# Patient Record
Sex: FEMALE | Race: WHITE | Employment: OTHER | URBAN - METROPOLITAN AREA
[De-identification: names, ages, dates, MRNs, and addresses within clinical notes are randomized per-mention and may not be internally consistent; named-entity substitution may affect disease eponyms.]

---

## 2017-07-14 ENCOUNTER — HOSPITAL ENCOUNTER (EMERGENCY)
Age: 27
Discharge: HOME | End: 2017-07-15
Attending: EMERGENCY MEDICINE

## 2017-07-14 DIAGNOSIS — K04.7 DENTAL ABSCESS: Primary | ICD-10-CM

## 2017-07-14 DIAGNOSIS — D69.6 THROMBOCYTOPENIA (HCC): ICD-10-CM

## 2017-07-14 PROCEDURE — 96376 TX/PRO/DX INJ SAME DRUG ADON: CPT

## 2017-07-14 PROCEDURE — 99284 EMERGENCY DEPT VISIT MOD MDM: CPT

## 2017-07-14 PROCEDURE — 96365 THER/PROPH/DIAG IV INF INIT: CPT

## 2017-07-14 PROCEDURE — 96366 THER/PROPH/DIAG IV INF ADDON: CPT

## 2017-07-14 PROCEDURE — 96361 HYDRATE IV INFUSION ADD-ON: CPT

## 2017-07-14 PROCEDURE — 96375 TX/PRO/DX INJ NEW DRUG ADDON: CPT

## 2017-07-15 VITALS
OXYGEN SATURATION: 100 % | WEIGHT: 120 LBS | BODY MASS INDEX: 23 KG/M2 | HEART RATE: 62 BPM | TEMPERATURE: 98 F | DIASTOLIC BLOOD PRESSURE: 63 MMHG | RESPIRATION RATE: 18 BRPM | SYSTOLIC BLOOD PRESSURE: 103 MMHG

## 2017-07-15 LAB
BASOPHILS # BLD AUTO: 0.04 X10(3) UL (ref 0–0.1)
BASOPHILS NFR BLD AUTO: 0.4 %
EOSINOPHIL # BLD AUTO: 0.19 X10(3) UL (ref 0–0.3)
EOSINOPHIL NFR BLD AUTO: 1.7 %
ERYTHROCYTE [DISTWIDTH] IN BLOOD BY AUTOMATED COUNT: 13.6 % (ref 11.5–16)
HCT VFR BLD AUTO: 40.4 % (ref 34–50)
HGB BLD-MCNC: 14 G/DL (ref 12–16)
IMMATURE GRANULOCYTE COUNT: 0.03 X10(3) UL (ref 0–1)
IMMATURE GRANULOCYTE RATIO %: 0.3 %
LYMPHOCYTES # BLD AUTO: 2.68 X10(3) UL (ref 0.9–4)
LYMPHOCYTES NFR BLD AUTO: 24.3 %
MCH RBC QN AUTO: 32 PG (ref 27–33.2)
MCHC RBC AUTO-ENTMCNC: 34.7 G/DL (ref 31–37)
MCV RBC AUTO: 92.2 FL (ref 81–100)
MONOCYTES # BLD AUTO: 1.03 X10(3) UL (ref 0.1–0.6)
MONOCYTES NFR BLD AUTO: 9.3 %
NEUTROPHIL ABS PRELIM: 7.08 X10 (3) UL (ref 1.3–6.7)
NEUTROPHILS # BLD AUTO: 7.08 X10(3) UL (ref 1.3–6.7)
NEUTROPHILS NFR BLD AUTO: 64 %
PLATELET # BLD AUTO: 42 10(3)UL (ref 150–450)
RBC # BLD AUTO: 4.38 X10(6)UL (ref 3.8–5.1)
RED CELL DISTRIBUTION WIDTH-SD: 46.3 FL (ref 35.1–46.3)
WBC # BLD AUTO: 11.1 X10(3) UL (ref 4–13)

## 2017-07-15 PROCEDURE — S0077 INJECTION, CLINDAMYCIN PHOSP: HCPCS | Performed by: EMERGENCY MEDICINE

## 2017-07-15 PROCEDURE — 85025 COMPLETE CBC W/AUTO DIFF WBC: CPT | Performed by: EMERGENCY MEDICINE

## 2017-07-15 RX ORDER — CLINDAMYCIN HYDROCHLORIDE 300 MG/1
300 CAPSULE ORAL 3 TIMES DAILY
Qty: 30 CAPSULE | Refills: 0 | Status: SHIPPED | OUTPATIENT
Start: 2017-07-15 | End: 2017-07-25

## 2017-07-15 RX ORDER — CLINDAMYCIN PHOSPHATE 600 MG/50ML
600 INJECTION INTRAVENOUS ONCE
Status: COMPLETED | OUTPATIENT
Start: 2017-07-15 | End: 2017-07-15

## 2017-07-15 RX ORDER — HYDROMORPHONE HYDROCHLORIDE 1 MG/ML
0.5 INJECTION, SOLUTION INTRAMUSCULAR; INTRAVENOUS; SUBCUTANEOUS EVERY 30 MIN PRN
Status: DISCONTINUED | OUTPATIENT
Start: 2017-07-15 | End: 2017-07-15

## 2017-07-15 NOTE — ED PROVIDER NOTES
Patient Seen in: [facility] In [place]    History   Patient presents with:  Dental Problem (dental)    Stated Complaint: two days of dental pain and infection with swelling    HPI    This is a 57-year-old female with past medical hist days of dental pain and infection with swelling  Other systems are as noted in HPI. Constitutional and vital signs reviewed. All other systems reviewed and negative except as noted above.     PSFH elements reviewed from today and agreed except as othe -----------         ------                     CBC W/ DIFFERENTIAL[[id number]]          Abnormal            Final result                 Please view results for these tests on the individual orders.       ==============================================

## 2017-07-16 ENCOUNTER — HOSPITAL ENCOUNTER (EMERGENCY)
Facility: HOSPITAL | Age: 27
Discharge: HOME | End: 2017-07-16
Attending: EMERGENCY MEDICINE

## 2017-07-16 VITALS
SYSTOLIC BLOOD PRESSURE: 130 MMHG | HEART RATE: 92 BPM | TEMPERATURE: 97 F | RESPIRATION RATE: 18 BRPM | DIASTOLIC BLOOD PRESSURE: 84 MMHG

## 2017-07-16 DIAGNOSIS — K08.89 PAIN, DENTAL: Primary | ICD-10-CM

## 2017-07-16 PROCEDURE — 99283 EMERGENCY DEPT VISIT LOW MDM: CPT

## 2017-07-16 RX ORDER — BUPIVACAINE HYDROCHLORIDE 2.5 MG/ML
INJECTION, SOLUTION EPIDURAL; INFILTRATION; INTRACAUDAL
Status: COMPLETED
Start: 2017-07-16 | End: 2017-07-16

## 2017-07-16 RX ORDER — OXYCODONE HYDROCHLORIDE AND ACETAMINOPHEN 5; 325 MG/1; MG/1
1-2 TABLET ORAL EVERY 4 HOURS PRN
Qty: 20 TABLET | Refills: 0 | Status: SHIPPED | OUTPATIENT
Start: 2017-07-16 | End: 2017-07-23

## 2017-07-16 RX ORDER — CLINDAMYCIN HYDROCHLORIDE 150 MG/1
300 CAPSULE ORAL ONCE
Status: COMPLETED | OUTPATIENT
Start: 2017-07-16 | End: 2017-07-16

## 2017-07-16 NOTE — ED PROVIDER NOTES
Patient Seen in: [facility] Emergency Department    History   Patient presents with:  Dental Problem (dental)    Stated Complaint:     HPI    She is a 22-year-old female comes in emergency room for evaluation of dental pain.   Patient states she crac 97.4 °F (36.3 °C) (Temporal)   Resp 18   LMP 07/03/2017         Physical Exam    Constitutional: Patient is holding the right side of her jaw. Mild amount discomfort due to pain  HEENT: Poor dentition. Teeth #30 and 28 are severely cracked and carried. [address]  [facility] [address]  [phone]      As needed      Medications Prescribed:  Current Discharge Medication List

## 2017-07-17 NOTE — ED NOTES
PT CALLED ON 7/17/17 AT 1600 STATING SHE LOST HER PRESCRIPTION FOR HER ANTIBIOTICS. RX FOR CLINDAMYCIN THAT WAS WRITTEN FOR HER 2 DAYS AGO WAS CALLED INTO [facility] [phone] PER PT REQUEST.

## 2017-09-12 ENCOUNTER — HOSPITAL ENCOUNTER (EMERGENCY)
Age: 27
Discharge: HOME | End: 2017-09-12

## 2017-09-12 VITALS
SYSTOLIC BLOOD PRESSURE: 94 MMHG | HEART RATE: 78 BPM | BODY MASS INDEX: 22.08 KG/M2 | OXYGEN SATURATION: 99 % | WEIGHT: 120 LBS | TEMPERATURE: 98 F | RESPIRATION RATE: 16 BRPM | DIASTOLIC BLOOD PRESSURE: 54 MMHG | HEIGHT: 62 IN

## 2017-09-12 DIAGNOSIS — D69.6 THROMBOCYTOPENIA (HCC): ICD-10-CM

## 2017-09-12 DIAGNOSIS — K02.9 DENTAL CARIES: Primary | ICD-10-CM

## 2017-09-12 LAB
BASOPHILS # BLD AUTO: 0.03 X10(3) UL (ref 0–0.1)
BASOPHILS NFR BLD AUTO: 0.4 %
EOSINOPHIL # BLD AUTO: 0.16 X10(3) UL (ref 0–0.3)
EOSINOPHIL NFR BLD AUTO: 1.9 %
ERYTHROCYTE [DISTWIDTH] IN BLOOD BY AUTOMATED COUNT: 12.6 % (ref 11.5–16)
HCT VFR BLD AUTO: 37 % (ref 34–50)
HGB BLD-MCNC: 12.6 G/DL (ref 12–16)
IMMATURE GRANULOCYTE COUNT: 0.01 X10(3) UL (ref 0–1)
IMMATURE GRANULOCYTE RATIO %: 0.1 %
LYMPHOCYTES # BLD AUTO: 2.74 X10(3) UL (ref 0.9–4)
LYMPHOCYTES NFR BLD AUTO: 32.6 %
MCH RBC QN AUTO: 32.1 PG (ref 27–33.2)
MCHC RBC AUTO-ENTMCNC: 34.1 G/DL (ref 31–37)
MCV RBC AUTO: 94.4 FL (ref 81–100)
MONOCYTES # BLD AUTO: 0.75 X10(3) UL (ref 0.1–0.6)
MONOCYTES NFR BLD AUTO: 8.9 %
NEUTROPHIL ABS PRELIM: 4.71 X10 (3) UL (ref 1.3–6.7)
NEUTROPHILS # BLD AUTO: 4.71 X10(3) UL (ref 1.3–6.7)
NEUTROPHILS NFR BLD AUTO: 56.1 %
PLATELET # BLD AUTO: 36 10(3)UL (ref 150–450)
RBC # BLD AUTO: 3.92 X10(6)UL (ref 3.8–5.1)
RED CELL DISTRIBUTION WIDTH-SD: 43.3 FL (ref 35.1–46.3)
WBC # BLD AUTO: 8.4 X10(3) UL (ref 4–13)

## 2017-09-12 PROCEDURE — 96372 THER/PROPH/DIAG INJ SC/IM: CPT

## 2017-09-12 PROCEDURE — 99283 EMERGENCY DEPT VISIT LOW MDM: CPT

## 2017-09-12 PROCEDURE — 85025 COMPLETE CBC W/AUTO DIFF WBC: CPT | Performed by: NURSE PRACTITIONER

## 2017-09-12 PROCEDURE — 36415 COLL VENOUS BLD VENIPUNCTURE: CPT

## 2017-09-12 RX ORDER — MORPHINE SULFATE 2 MG/ML
2 INJECTION, SOLUTION INTRAMUSCULAR; INTRAVENOUS ONCE
Status: COMPLETED | OUTPATIENT
Start: 2017-09-12 | End: 2017-09-12

## 2017-09-12 RX ORDER — CHLORHEXIDINE GLUCONATE 0.12 MG/ML
15 RINSE ORAL 2 TIMES DAILY
Qty: 1 BOTTLE | Refills: 0 | Status: SHIPPED | OUTPATIENT
Start: 2017-09-12

## 2017-09-12 RX ORDER — CLINDAMYCIN HYDROCHLORIDE 300 MG/1
300 CAPSULE ORAL 3 TIMES DAILY
Qty: 30 CAPSULE | Refills: 0 | Status: SHIPPED | OUTPATIENT
Start: 2017-09-12 | End: 2017-09-22

## 2017-09-12 NOTE — ED PROVIDER NOTES
Patient Seen in: [facility] Emergency Department In [facility]    History   Patient presents with:  Dental Problem (dental)    Stated Complaint: dental pain    42-year-old female who presents to the emergency room with complaints of chronic dental pain and o Negative for dizziness and headaches. All other systems reviewed and are negative. Positive for stated complaint: dental pain  Other systems are as noted in HPI. Constitutional and vital signs reviewed.       All other systems reviewed and negative place, and time. Skin: Skin is warm and dry. She is not diaphoretic. Psychiatric: She has a normal mood and affect. Her behavior is normal. Judgment and thought content normal.   Nursing note and vitals reviewed.       ED Course   BP medications at this evaluation and care. Patient states they understand diagnosis, followup plan and agree with and understand  discharge instructions and plan. I answered all of the patient's questions prior to discharge.           Disposition and Plan     Clinical Impression

## 2017-09-12 NOTE — ED INITIAL ASSESSMENT (HPI)
Right lower dental pain since yesterday, known abscess, on abx few months ago, needs to see oral surgeon

## 2017-12-07 ENCOUNTER — HOSPITAL ENCOUNTER (EMERGENCY)
Facility: HOSPITAL | Age: 27
Discharge: HOME | End: 2017-12-07
Attending: EMERGENCY MEDICINE

## 2017-12-07 VITALS
HEIGHT: 61 IN | WEIGHT: 128 LBS | HEART RATE: 68 BPM | RESPIRATION RATE: 16 BRPM | DIASTOLIC BLOOD PRESSURE: 71 MMHG | SYSTOLIC BLOOD PRESSURE: 114 MMHG | BODY MASS INDEX: 24.17 KG/M2 | OXYGEN SATURATION: 98 % | TEMPERATURE: 98 F

## 2017-12-07 DIAGNOSIS — K02.9 PAIN DUE TO DENTAL CARIES: Primary | ICD-10-CM

## 2017-12-07 PROCEDURE — 64400 NJX AA&/STRD TRIGEMINAL NRV: CPT

## 2017-12-07 PROCEDURE — 99283 EMERGENCY DEPT VISIT LOW MDM: CPT

## 2017-12-07 PROCEDURE — 96372 THER/PROPH/DIAG INJ SC/IM: CPT

## 2017-12-07 RX ORDER — CLINDAMYCIN HYDROCHLORIDE 150 MG/1
150 CAPSULE ORAL 3 TIMES DAILY
Qty: 30 CAPSULE | Refills: 0 | Status: ON HOLD | OUTPATIENT
Start: 2017-12-07 | End: 2017-12-10

## 2017-12-07 RX ORDER — HYDROMORPHONE HYDROCHLORIDE 1 MG/ML
1 INJECTION, SOLUTION INTRAMUSCULAR; INTRAVENOUS; SUBCUTANEOUS ONCE
Status: COMPLETED | OUTPATIENT
Start: 2017-12-07 | End: 2017-12-07

## 2017-12-07 RX ORDER — HYDROCODONE BITARTRATE AND ACETAMINOPHEN 5; 325 MG/1; MG/1
1-2 TABLET ORAL EVERY 4 HOURS PRN
Qty: 10 TABLET | Refills: 0 | Status: SHIPPED | OUTPATIENT
Start: 2017-12-07 | End: 2017-12-10

## 2017-12-07 NOTE — ED PROVIDER NOTES
Patient Seen in: [facility] Emergency Department    History   Patient presents with:  Dental Problem (dental)    Stated Complaint: dental pain    HPI    This is a 49-year-old female complaining of dental pain the patient has a history of severe dent to display    ED Course as of Dec 07 1203  ------------------------------------------------------------       MDM   Patient requested a local injection of anesthetic as she had had this before she was given Sensorcaine local block near the tooth #18 with i

## 2017-12-08 ENCOUNTER — HOSPITAL ENCOUNTER (EMERGENCY)
Facility: HOSPITAL | Age: 27
Discharge: HOME | End: 2017-12-08
Attending: STUDENT IN AN ORGANIZED HEALTH CARE EDUCATION/TRAINING PROGRAM

## 2017-12-08 ENCOUNTER — APPOINTMENT (OUTPATIENT)
Dept: CT IMAGING | Facility: HOSPITAL | Age: 27
End: 2017-12-08
Attending: STUDENT IN AN ORGANIZED HEALTH CARE EDUCATION/TRAINING PROGRAM

## 2017-12-08 VITALS
DIASTOLIC BLOOD PRESSURE: 77 MMHG | WEIGHT: 128 LBS | OXYGEN SATURATION: 99 % | TEMPERATURE: 98 F | RESPIRATION RATE: 14 BRPM | HEART RATE: 59 BPM | SYSTOLIC BLOOD PRESSURE: 125 MMHG | HEIGHT: 61 IN | BODY MASS INDEX: 24.17 KG/M2

## 2017-12-08 DIAGNOSIS — K12.2 CELLULITIS OF SUBMANDIBULAR REGION: ICD-10-CM

## 2017-12-08 DIAGNOSIS — K08.89 PAIN, DENTAL: Primary | ICD-10-CM

## 2017-12-08 PROCEDURE — 76377 3D RENDER W/INTRP POSTPROCES: CPT | Performed by: STUDENT IN AN ORGANIZED HEALTH CARE EDUCATION/TRAINING PROGRAM

## 2017-12-08 PROCEDURE — 96375 TX/PRO/DX INJ NEW DRUG ADDON: CPT

## 2017-12-08 PROCEDURE — 80053 COMPREHEN METABOLIC PANEL: CPT | Performed by: STUDENT IN AN ORGANIZED HEALTH CARE EDUCATION/TRAINING PROGRAM

## 2017-12-08 PROCEDURE — 70486 CT MAXILLOFACIAL W/O DYE: CPT | Performed by: STUDENT IN AN ORGANIZED HEALTH CARE EDUCATION/TRAINING PROGRAM

## 2017-12-08 PROCEDURE — 99284 EMERGENCY DEPT VISIT MOD MDM: CPT

## 2017-12-08 PROCEDURE — 85025 COMPLETE CBC W/AUTO DIFF WBC: CPT | Performed by: STUDENT IN AN ORGANIZED HEALTH CARE EDUCATION/TRAINING PROGRAM

## 2017-12-08 PROCEDURE — S0077 INJECTION, CLINDAMYCIN PHOSP: HCPCS | Performed by: STUDENT IN AN ORGANIZED HEALTH CARE EDUCATION/TRAINING PROGRAM

## 2017-12-08 PROCEDURE — 96365 THER/PROPH/DIAG IV INF INIT: CPT

## 2017-12-08 PROCEDURE — 96361 HYDRATE IV INFUSION ADD-ON: CPT

## 2017-12-08 RX ORDER — HYDROMORPHONE HYDROCHLORIDE 1 MG/ML
0.5 INJECTION, SOLUTION INTRAMUSCULAR; INTRAVENOUS; SUBCUTANEOUS ONCE
Status: COMPLETED | OUTPATIENT
Start: 2017-12-08 | End: 2017-12-08

## 2017-12-08 RX ORDER — CLINDAMYCIN PHOSPHATE 600 MG/50ML
600 INJECTION INTRAVENOUS EVERY 8 HOURS
Status: DISCONTINUED | OUTPATIENT
Start: 2017-12-08 | End: 2017-12-08

## 2017-12-08 RX ORDER — ONDANSETRON 2 MG/ML
4 INJECTION INTRAMUSCULAR; INTRAVENOUS ONCE
Status: COMPLETED | OUTPATIENT
Start: 2017-12-08 | End: 2017-12-08

## 2017-12-08 RX ORDER — HYDROCODONE BITARTRATE AND ACETAMINOPHEN 5; 325 MG/1; MG/1
1-2 TABLET ORAL EVERY 4 HOURS PRN
Qty: 5 TABLET | Refills: 0 | Status: ON HOLD | OUTPATIENT
Start: 2017-12-08 | End: 2017-12-10

## 2017-12-08 NOTE — ED NOTES
Pt asked for a 3rd dose of dilaudid several times because she still had pain. Pt was told that Dr. Glendy Grady is aware and is not ordering any more pain medication. Pt is AxOx4, ambulatory with steady gait, and going home with her friend.

## 2017-12-08 NOTE — ED PROVIDER NOTES
Patient Seen in: [facility] Emergency Department    History   Patient presents with:  Dental Problem (dental)    Stated Complaint: dental pain     HPI    Patient is a 57-year-old female who presents the emergency department reporting severe left low vital signs reviewed. All other systems reviewed and negative except as noted above.     Physical Exam   ED Triage Vitals [12/08/17 0418]  BP: 125/77  Pulse: 60  Resp: 18  Temp: 97.6 °F (36.4 °C)  Temp src: Temporal  SpO2: 99 %  O2 Device: None (Room a components within normal limits   CBC WITH DIFFERENTIAL WITH PLATELET    Narrative: The following orders were created for panel order CBC WITH DIFFERENTIAL WITH PLATELET.   Procedure                               Abnormality         Status [place] [id number]  [phone]    Today          Medications Prescribed:  Current Discharge Medication List    START taking these medications    !! HYDROcodone-acetaminophen 5-325 MG Oral Tab  Take 1-2 tablets by mouth every 4 (four) hours as nepatricia

## 2017-12-08 NOTE — ED INITIAL ASSESSMENT (HPI)
Pt states she is here because the ER doctor yesterday prescribed her the \"wrong antibiotic. \" When asked why it was Sherryle Curb" she stated it is because she is allergic to cillins and because she is still in pain.  Pt was educated on the fact that the clinda

## 2017-12-09 ENCOUNTER — APPOINTMENT (OUTPATIENT)
Dept: CT IMAGING | Facility: HOSPITAL | Age: 27
DRG: 158 | End: 2017-12-09
Attending: EMERGENCY MEDICINE

## 2017-12-09 ENCOUNTER — HOSPITAL ENCOUNTER (INPATIENT)
Facility: HOSPITAL | Age: 27
LOS: 1 days | Discharge: HOME | DRG: 158 | End: 2017-12-10
Attending: EMERGENCY MEDICINE | Admitting: HOSPITALIST

## 2017-12-09 DIAGNOSIS — K12.2 SUBMANDIBULAR ABSCESS: Primary | ICD-10-CM

## 2017-12-09 PROBLEM — D72.829 LEUKOCYTOSIS: Status: ACTIVE | Noted: 2017-12-09

## 2017-12-09 PROBLEM — D69.6 THROMBOCYTOPENIA (HCC): Status: ACTIVE | Noted: 2017-12-09

## 2017-12-09 PROBLEM — R73.9 HYPERGLYCEMIA: Status: ACTIVE | Noted: 2017-12-09

## 2017-12-09 PROCEDURE — 70491 CT SOFT TISSUE NECK W/DYE: CPT | Performed by: EMERGENCY MEDICINE

## 2017-12-09 PROCEDURE — 99223 1ST HOSP IP/OBS HIGH 75: CPT | Performed by: HOSPITALIST

## 2017-12-09 RX ORDER — SODIUM CHLORIDE 9 MG/ML
INJECTION, SOLUTION INTRAVENOUS ONCE
Status: COMPLETED | OUTPATIENT
Start: 2017-12-09 | End: 2017-12-09

## 2017-12-09 RX ORDER — ONDANSETRON 2 MG/ML
4 INJECTION INTRAMUSCULAR; INTRAVENOUS EVERY 4 HOURS PRN
Status: DISCONTINUED | OUTPATIENT
Start: 2017-12-09 | End: 2017-12-10

## 2017-12-09 RX ORDER — CLINDAMYCIN PHOSPHATE 600 MG/50ML
600 INJECTION INTRAVENOUS ONCE
Status: COMPLETED | OUTPATIENT
Start: 2017-12-09 | End: 2017-12-09

## 2017-12-09 RX ORDER — KETOROLAC TROMETHAMINE 30 MG/ML
15 INJECTION, SOLUTION INTRAMUSCULAR; INTRAVENOUS EVERY 6 HOURS PRN
Status: DISCONTINUED | OUTPATIENT
Start: 2017-12-09 | End: 2017-12-09

## 2017-12-09 RX ORDER — DEXAMETHASONE SODIUM PHOSPHATE 4 MG/ML
8 VIAL (ML) INJECTION EVERY 6 HOURS
Status: DISCONTINUED | OUTPATIENT
Start: 2017-12-09 | End: 2017-12-10

## 2017-12-09 RX ORDER — ACETAMINOPHEN 500 MG
1000 TABLET ORAL
COMMUNITY

## 2017-12-09 RX ORDER — ONDANSETRON 2 MG/ML
4 INJECTION INTRAMUSCULAR; INTRAVENOUS ONCE
Status: COMPLETED | OUTPATIENT
Start: 2017-12-09 | End: 2017-12-09

## 2017-12-09 RX ORDER — NICOTINE 21 MG/24HR
1 PATCH, TRANSDERMAL 24 HOURS TRANSDERMAL DAILY
Status: DISCONTINUED | OUTPATIENT
Start: 2017-12-09 | End: 2017-12-10

## 2017-12-09 RX ORDER — HYDROMORPHONE HYDROCHLORIDE 1 MG/ML
0.5 INJECTION, SOLUTION INTRAMUSCULAR; INTRAVENOUS; SUBCUTANEOUS EVERY 30 MIN PRN
Status: ACTIVE | OUTPATIENT
Start: 2017-12-09 | End: 2017-12-09

## 2017-12-09 RX ORDER — SODIUM CHLORIDE 9 MG/ML
INJECTION, SOLUTION INTRAVENOUS CONTINUOUS
Status: ACTIVE | OUTPATIENT
Start: 2017-12-09 | End: 2017-12-09

## 2017-12-09 RX ORDER — HYDROCODONE BITARTRATE AND ACETAMINOPHEN 5; 325 MG/1; MG/1
1-2 TABLET ORAL EVERY 4 HOURS PRN
Status: DISCONTINUED | OUTPATIENT
Start: 2017-12-09 | End: 2017-12-10

## 2017-12-09 RX ORDER — HYDROMORPHONE HYDROCHLORIDE 1 MG/ML
1 INJECTION, SOLUTION INTRAMUSCULAR; INTRAVENOUS; SUBCUTANEOUS EVERY 30 MIN PRN
Status: DISCONTINUED | OUTPATIENT
Start: 2017-12-09 | End: 2017-12-09

## 2017-12-09 RX ORDER — HYDROMORPHONE HYDROCHLORIDE 1 MG/ML
0.5 INJECTION, SOLUTION INTRAMUSCULAR; INTRAVENOUS; SUBCUTANEOUS EVERY 4 HOURS PRN
Status: DISCONTINUED | OUTPATIENT
Start: 2017-12-09 | End: 2017-12-10

## 2017-12-09 RX ORDER — CLINDAMYCIN PHOSPHATE 600 MG/50ML
600 INJECTION INTRAVENOUS EVERY 6 HOURS
Status: DISCONTINUED | OUTPATIENT
Start: 2017-12-09 | End: 2017-12-10

## 2017-12-09 RX ORDER — POTASSIUM CHLORIDE 14.9 MG/ML
20 INJECTION INTRAVENOUS ONCE
Status: COMPLETED | OUTPATIENT
Start: 2017-12-09 | End: 2017-12-09

## 2017-12-09 RX ORDER — CLINDAMYCIN PHOSPHATE 600 MG/50ML
600 INJECTION INTRAVENOUS EVERY 8 HOURS
Status: DISCONTINUED | OUTPATIENT
Start: 2017-12-09 | End: 2017-12-09

## 2017-12-09 RX ORDER — ALPRAZOLAM 1 MG/1
1 TABLET ORAL 3 TIMES DAILY
Status: DISCONTINUED | OUTPATIENT
Start: 2017-12-09 | End: 2017-12-10

## 2017-12-09 RX ORDER — ONDANSETRON 2 MG/ML
4 INJECTION INTRAMUSCULAR; INTRAVENOUS EVERY 6 HOURS PRN
Status: DISCONTINUED | OUTPATIENT
Start: 2017-12-09 | End: 2017-12-10

## 2017-12-09 RX ORDER — ACETAMINOPHEN 325 MG/1
650 TABLET ORAL EVERY 6 HOURS PRN
Status: DISCONTINUED | OUTPATIENT
Start: 2017-12-09 | End: 2017-12-10

## 2017-12-09 RX ORDER — KETOROLAC TROMETHAMINE 30 MG/ML
30 INJECTION, SOLUTION INTRAMUSCULAR; INTRAVENOUS EVERY 6 HOURS PRN
Status: DISCONTINUED | OUTPATIENT
Start: 2017-12-09 | End: 2017-12-09

## 2017-12-09 NOTE — ED PROVIDER NOTES
Patient Seen in: [facility] Emergency Department    History   Patient presents with:  Dental Problem (dental)    Stated Complaint: dental infection, worsening    HPI    Patient is a 42-year-old female presents emergency room for evaluation of left-s accommodation, extraocular motion is intact, sclerae white, conjunctiva is pink. .  Extremely poor dentition. Unable to visualize posterior pharynx.  patient has trismus and only able to open the mouth about 2 cm  NECK: Patient with moderate to severe lef of the mandible (best seen on axial im 30-31 and cor im 22), measuring ~13 x 4 x 12 mm with peripheral enhancement.    Also infiltration of the parapharyngeal fat, left greater than right, and the left submandibular gland appears mildly enlarged and hyperva Discharge Medication List        Present on Admission  Date Reviewed: 8/11/2015          ICD-10-CM Noted POA    Hyperglycemia R73.9 12/9/2017 Yes    Leukocytosis D72.829 12/9/2017 Yes    Submandibular abscess K12.2 12/9/2017 Unknown    Thrombocytopenia (HC

## 2017-12-09 NOTE — PLAN OF CARE
GASTROINTESTINAL - ADULT    • Maintains adequate nutritional intake (undernourished) Progressing        Patient/Family Goals    • Patient/Family Short Term Goal Progressing            PROBLEM: Left submandibular abscess     ASSESS: Pt a&ox4, VSS - SBP in t

## 2017-12-09 NOTE — PROGRESS NOTES
ARRIVED TO FLOOR WITH COMPLAIN OF SEVERE PAIN TO HER LEFT FACE. DILAUDID 1 MG WAS GIVEN IV AS PER HOLDING ORDER. WILL MONITOR FOR RELIEF.

## 2017-12-09 NOTE — ED INITIAL ASSESSMENT (HPI)
Pt presents to ED with dental pain and facial swelling. Pt seen in ED twice for same in the last 2 days. Reports she was unable to followup with dentist because she slept late.  Norco without relief

## 2017-12-09 NOTE — H&P
[facility]  History and Physical     Ana M Hargrove Patient Status:  Inpatient    1990 MRN [id number]   [facility] 5NW-A Attending Raffaele Carlton, DO   Hosp Day # 0 PCP None Pcp     Chief Complaint: Facial pain/swelling    Hist Mouth/Throat Solution Use as directed 15 mL in the mouth or throat 2 (two) times daily. Disp: 1 Bottle Rfl: 0   alprazolam 1 MG Oral Tab Take 1 mg by mouth 3 (three) times daily.    Disp:  Rfl:        Review of Systems:   A comprehensive 14 point review of / PLAN:     1. Dion's angina/left subperiosteal abscess  1. CT soft tissue neck noted  2. Oral surgeon to eval for abscess drainage in AM  3. NPO, IVF, anti-emetics, pain control   4. Continue IV clindamycin   2. Anxiety disorder  1.  Schedule xanax per h

## 2017-12-09 NOTE — ED NOTES
Pt c/o pain. Pt states \"the pain medication worked for like 3 minutes but now its worse. \"  Orders reviewed and will medicate as ordered.

## 2017-12-09 NOTE — PROGRESS NOTES
12/09/17 2166   Clinical Encounter Type   Visited With Patient   Routine Visit ([facility]go responded to request for spiritual care.)   Continue Visiting Yes  (Pt wants communion, but tonight she is NPO.   Pt will hve nurse request communion for her when she

## 2017-12-09 NOTE — PROGRESS NOTES
NURSING ADMISSION NOTE      Patient RECEIVED FROM THE ED via Cart AND ADMITTED TO [address] DX OF SUBMANDIBULAR ABCESS. IS APPEARS DROWSY BUT ABLE TO ANSWER QUESTIONS ON ADMISSION NAVIGATOR.  SHE RATED HER PAIN AT 12 (PN A SCALE OF 1 TO 10) UPON ARRI

## 2017-12-09 NOTE — CONSULTS
OMFS Consult Note  Asked to see patient admitted thru ED for left orofacial swelling. Patient is  31 y/o female who states selling developed yesterday. At time of visit patient is in moderate distress secondary to pain. She denies SOB.   Does c/o pain wit

## 2017-12-09 NOTE — PROGRESS NOTES
12/09/17 2376   Clinical Encounter Type   Visited With Patient   Routine Visit ([facility] responded to request for spiritual care.)   Continue Visiting Yes  (Pt wants communion, but tonight she is NPO.   Pt will have nurse request communion for her when sh

## 2017-12-09 NOTE — PROGRESS NOTES
[facility]  Progress Note     Tori Ruiz Patient Status:  Inpatient    1990 MRN [id number]   [facility] 5NW-A Attending Ledy Vora MD   Hosp Day # 0 PCP None Pcp     Chief Complaint: facial swelling, pain    S: Patien abscess  1. CT soft tissue neck noted  2. Oral surgeon following  3. Plan to monitor with IV abx for now  4. NPO, IVF, anti-emetics, pain control   5. Continue IV clindamycin   2. Anxiety disorder  1. Schedule xanax per home regimen  3.  Opiate dependence

## 2017-12-10 VITALS
OXYGEN SATURATION: 96 % | TEMPERATURE: 98 F | DIASTOLIC BLOOD PRESSURE: 67 MMHG | SYSTOLIC BLOOD PRESSURE: 108 MMHG | RESPIRATION RATE: 16 BRPM | HEART RATE: 58 BPM | BODY MASS INDEX: 22.6 KG/M2 | WEIGHT: 119.69 LBS | HEIGHT: 61 IN

## 2017-12-10 PROCEDURE — 99239 HOSP IP/OBS DSCHRG MGMT >30: CPT | Performed by: INTERNAL MEDICINE

## 2017-12-10 RX ORDER — HYDROCODONE BITARTRATE AND ACETAMINOPHEN 5; 325 MG/1; MG/1
1-2 TABLET ORAL EVERY 4 HOURS PRN
Qty: 30 TABLET | Refills: 0 | Status: SHIPPED | OUTPATIENT
Start: 2017-12-10

## 2017-12-10 RX ORDER — METHYLPREDNISOLONE 4 MG/1
TABLET ORAL
Qty: 21 TABLET | Refills: 0 | Status: SHIPPED | OUTPATIENT
Start: 2017-12-10 | End: 2018-09-07

## 2017-12-10 RX ORDER — CLINDAMYCIN HYDROCHLORIDE 150 MG/1
300 CAPSULE ORAL 4 TIMES DAILY
Qty: 30 CAPSULE | Refills: 0 | Status: SHIPPED | OUTPATIENT
Start: 2017-12-10 | End: 2017-12-20

## 2017-12-10 NOTE — DISCHARGE SUMMARY
[facility]  DISCHARGE SUMMARY     Samantha Slade Patient Status:  Inpatient    1990 MRN [id number]   [facility] 5NW-A Attending Naheed Barreto MD   [facility] Day # 1 PCP None Pcp     Date of Admission: 2017  Date of Discharge results pending at Discharge:   · none    Consultants:  • Oral Surgery    Discharge Medication List:     Discharge Medications      START taking these medications      Instructions Prescription details   methylPREDNISolone 4 MG Tbpk  Commonly known as:  ME medications  · HYDROcodone-acetaminophen 5-325 MG Tabs         Follow-up appointment:   Ochoa Melgar DDS  [address] [place] [address]  [phone]    In 1 day        Vital signs:  Temp:  [97.4 °F (36.3 °C)-97.5 °F (36.4 °C)] 97.5 °F (36.4

## 2017-12-10 NOTE — PROGRESS NOTES
Patient seen tonight. Still c/o pain needing Dilaudid on regular regimen. States feels slightly better. Exam:  Interincisal opening 20 mm. Oropharynx visualized . No pharyngeal swelling. Nadege Bucker FOM soft. Tongue not elevated.    Left lower buccal swelling re

## 2017-12-10 NOTE — PROGRESS NOTES
[facility]  Progress Note     Neeru Holbrook Patient Status:  Inpatient    1990 MRN [id number]   [facility] 5NW-A Attending Lashay Mccullough MD   [facility] Day # 1 PCP None Pcp     Chief Complaint: facial swelling, pain    S: Patien mg Intravenous Q6H   • clindamycin  600 mg Intravenous Q6H   • nicotine  1 patch Transdermal Daily       ASSESSMENT / PLAN:     1. Dion's angina/left subperiosteal abscess  1. CT soft tissue neck noted  2. Oral surgeon following  3.  Plan to monitor with

## 2017-12-10 NOTE — PROGRESS NOTES
NURSING DISCHARGE NOTE    Discharged Home via Ambulatory. Accompanied by Family member  Belongings Taken by patient/family.     Discharge instructions reviewed with patient and family member at the bedside, PIV discontinued, instructed on the importanc

## 2017-12-10 NOTE — PLAN OF CARE
GASTROINTESTINAL - ADULT    • Maintains adequate nutritional intake (undernourished) Completed        METABOLIC/FLUID AND ELECTROLYTES - ADULT    • Electrolytes maintained within normal limits Completed        NEUROLOGICAL - ADULT    • Achieves stable or i

## 2017-12-10 NOTE — PROGRESS NOTES
Patient seen this AM.  Still c/o pain ,however, it has reduced in intensity. VSS  Afebrile  Facial swelling reduced. No erythema. Inter incisal opening 20 mm. Oropharynx and FOM WNL  Left buccal swelling reduced. A: Resolving cellulitis.   P:Recommend to

## 2017-12-10 NOTE — PLAN OF CARE
Received patient a/ox4. Continues to report pain to left side of face. She is requesting that her Dilaudid dose be increased. Swelling improved, no new complications noted. She was medicated throughout the night with Dilaudid and norco prn.  She was updated

## 2017-12-13 ENCOUNTER — HOSPITAL ENCOUNTER (EMERGENCY)
Facility: HOSPITAL | Age: 27
Discharge: HOME | End: 2017-12-13
Attending: EMERGENCY MEDICINE

## 2017-12-13 VITALS
BODY MASS INDEX: 24.17 KG/M2 | TEMPERATURE: 98 F | OXYGEN SATURATION: 98 % | RESPIRATION RATE: 18 BRPM | HEART RATE: 77 BPM | WEIGHT: 128 LBS | DIASTOLIC BLOOD PRESSURE: 67 MMHG | SYSTOLIC BLOOD PRESSURE: 120 MMHG | HEIGHT: 61 IN

## 2017-12-13 DIAGNOSIS — K08.89 PAIN, DENTAL: Primary | ICD-10-CM

## 2017-12-13 PROCEDURE — 99281 EMR DPT VST MAYX REQ PHY/QHP: CPT

## 2017-12-14 NOTE — ED INITIAL ASSESSMENT (HPI)
Pt is here with dental pain. She has been coming to the ED several times for the dental pain. She was admitted and was discharged 3 days ago. She states she missed her appointment with the oral surgeon.

## 2017-12-14 NOTE — ED PROVIDER NOTES
Patient Seen in: [facility] Emergency Department    History   Patient presents with:  Dental Problem (dental)    Stated Complaint: DENTAL PAIN    HPI    20-year-old female presents to the emergency department complaining of persistent jaw pain.   Vicki Boudreaux light.  Extra ocular motions are intact. No scleral icterus or conjunctival pallor: Neck is supple without tenderness on palpation. Head is atraumatic normocephalic. Oral mucosa moist.  Tongue is midline. No posterior pharyngeal lesions.   No stridorous

## 2017-12-14 NOTE — ED NOTES
Pt is not happy about being discharged. She was instructed to see an oral surgeon. She states she won't go to [place] because it will take so long to be seen. Discharge instructions were reviewed. Pt is AxOx4 and ambulatory with steady gait.

## 2018-09-03 ENCOUNTER — HOSPITAL ENCOUNTER (OUTPATIENT)
Facility: HOSPITAL | Age: 28
Setting detail: OBSERVATION
Discharge: HOME | End: 2018-09-05
Attending: EMERGENCY MEDICINE | Admitting: HOSPITALIST

## 2018-09-03 DIAGNOSIS — O26.892 ABDOMINAL PAIN DURING PREGNANCY IN SECOND TRIMESTER: Primary | ICD-10-CM

## 2018-09-03 DIAGNOSIS — R10.9 ABDOMINAL PAIN DURING PREGNANCY IN SECOND TRIMESTER: Primary | ICD-10-CM

## 2018-09-03 PROBLEM — N23 RENAL COLIC: Status: ACTIVE | Noted: 2018-09-03

## 2018-09-03 LAB
ALBUMIN SERPL-MCNC: 2.9 G/DL (ref 3.5–4.8)
ALBUMIN/GLOB SERPL: 1 {RATIO} (ref 1–2)
ALP LIVER SERPL-CCNC: 60 U/L (ref 37–98)
ALT SERPL-CCNC: 18 U/L (ref 14–54)
ANION GAP SERPL CALC-SCNC: 6 MMOL/L (ref 0–18)
ANION GAP SERPL CALC-SCNC: 8 MMOL/L (ref 0–18)
AST SERPL-CCNC: 15 U/L (ref 15–41)
BASOPHILS # BLD AUTO: 0.05 X10(3) UL (ref 0–0.1)
BASOPHILS # BLD AUTO: 0.05 X10(3) UL (ref 0–0.1)
BASOPHILS NFR BLD AUTO: 0.4 %
BASOPHILS NFR BLD AUTO: 0.4 %
BILIRUB SERPL-MCNC: 0.1 MG/DL (ref 0.1–2)
BILIRUB UR QL STRIP.AUTO: NEGATIVE
BUN BLD-MCNC: 7 MG/DL (ref 8–20)
BUN BLD-MCNC: 8 MG/DL (ref 8–20)
BUN/CREAT SERPL: 20 (ref 10–20)
BUN/CREAT SERPL: 21.9 (ref 10–20)
CALCIUM BLD-MCNC: 8.3 MG/DL (ref 8.3–10.3)
CALCIUM BLD-MCNC: 8.7 MG/DL (ref 8.3–10.3)
CHLORIDE SERPL-SCNC: 110 MMOL/L (ref 101–111)
CHLORIDE SERPL-SCNC: 111 MMOL/L (ref 101–111)
CLARITY UR REFRACT.AUTO: CLEAR
CO2 SERPL-SCNC: 20 MMOL/L (ref 22–32)
CO2 SERPL-SCNC: 24 MMOL/L (ref 22–32)
CREAT BLD-MCNC: 0.32 MG/DL (ref 0.55–1.02)
CREAT BLD-MCNC: 0.4 MG/DL (ref 0.55–1.02)
EOSINOPHIL # BLD AUTO: 0.19 X10(3) UL (ref 0–0.3)
EOSINOPHIL # BLD AUTO: 0.23 X10(3) UL (ref 0–0.3)
EOSINOPHIL NFR BLD AUTO: 1.5 %
EOSINOPHIL NFR BLD AUTO: 1.7 %
ERYTHROCYTE [DISTWIDTH] IN BLOOD BY AUTOMATED COUNT: 12.5 % (ref 11.5–16)
ERYTHROCYTE [DISTWIDTH] IN BLOOD BY AUTOMATED COUNT: 12.6 % (ref 11.5–16)
GLOBULIN PLAS-MCNC: 3 G/DL (ref 2.5–4)
GLUCOSE BLD-MCNC: 86 MG/DL (ref 70–99)
GLUCOSE BLD-MCNC: 87 MG/DL (ref 70–99)
GLUCOSE UR STRIP.AUTO-MCNC: NEGATIVE MG/DL
HCG URINE QUALITATIVE: POSITIVE
HCT VFR BLD AUTO: 34.8 % (ref 34–50)
HCT VFR BLD AUTO: 35.2 % (ref 34–50)
HGB BLD-MCNC: 11.6 G/DL (ref 12–16)
HGB BLD-MCNC: 11.9 G/DL (ref 12–16)
IMMATURE GRANULOCYTE COUNT: 0.2 X10(3) UL (ref 0–1)
IMMATURE GRANULOCYTE COUNT: 0.23 X10(3) UL (ref 0–1)
IMMATURE GRANULOCYTE RATIO %: 1.6 %
IMMATURE GRANULOCYTE RATIO %: 1.7 %
KETONES UR STRIP.AUTO-MCNC: NEGATIVE MG/DL
LEUKOCYTE ESTERASE UR QL STRIP.AUTO: NEGATIVE
LIPASE: 114 U/L (ref 73–393)
LYMPHOCYTES # BLD AUTO: 2.84 X10(3) UL (ref 0.9–4)
LYMPHOCYTES # BLD AUTO: 2.93 X10(3) UL (ref 0.9–4)
LYMPHOCYTES NFR BLD AUTO: 21.1 %
LYMPHOCYTES NFR BLD AUTO: 23 %
M PROTEIN MFR SERPL ELPH: 5.9 G/DL (ref 6.1–8.3)
MCH RBC QN AUTO: 32.4 PG (ref 27–33.2)
MCH RBC QN AUTO: 32.4 PG (ref 27–33.2)
MCHC RBC AUTO-ENTMCNC: 33.3 G/DL (ref 31–37)
MCHC RBC AUTO-ENTMCNC: 33.8 G/DL (ref 31–37)
MCV RBC AUTO: 95.9 FL (ref 81–100)
MCV RBC AUTO: 97.2 FL (ref 81–100)
MONOCYTES # BLD AUTO: 0.64 X10(3) UL (ref 0.1–1)
MONOCYTES # BLD AUTO: 0.93 X10(3) UL (ref 0.1–1)
MONOCYTES NFR BLD AUTO: 5.2 %
MONOCYTES NFR BLD AUTO: 6.7 %
NEUTROPHIL ABS PRELIM: 8.43 X10 (3) UL (ref 1.3–6.7)
NEUTROPHIL ABS PRELIM: 9.49 X10 (3) UL (ref 1.3–6.7)
NEUTROPHILS # BLD AUTO: 8.43 X10(3) UL (ref 1.3–6.7)
NEUTROPHILS # BLD AUTO: 9.49 X10(3) UL (ref 1.3–6.7)
NEUTROPHILS NFR BLD AUTO: 68.3 %
NEUTROPHILS NFR BLD AUTO: 68.4 %
NITRITE UR QL STRIP.AUTO: NEGATIVE
OSMOLALITY SERPL CALC.SUM OF ELEC: 285 MOSM/KG (ref 275–295)
OSMOLALITY SERPL CALC.SUM OF ELEC: 288 MOSM/KG (ref 275–295)
PH UR STRIP.AUTO: 7 [PH] (ref 4.5–8)
PLATELET # BLD AUTO: 57 10(3)UL (ref 150–450)
PLATELET # BLD AUTO: 64 10(3)UL (ref 150–450)
POTASSIUM SERPL-SCNC: 3.7 MMOL/L (ref 3.6–5.1)
POTASSIUM SERPL-SCNC: 3.8 MMOL/L (ref 3.6–5.1)
PROT UR STRIP.AUTO-MCNC: NEGATIVE MG/DL
RBC # BLD AUTO: 3.58 X10(6)UL (ref 3.8–5.1)
RBC # BLD AUTO: 3.67 X10(6)UL (ref 3.8–5.1)
RED CELL DISTRIBUTION WIDTH-SD: 44.5 FL (ref 35.1–46.3)
RED CELL DISTRIBUTION WIDTH-SD: 44.8 FL (ref 35.1–46.3)
SODIUM SERPL-SCNC: 139 MMOL/L (ref 136–144)
SODIUM SERPL-SCNC: 140 MMOL/L (ref 136–144)
SP GR UR STRIP.AUTO: 1.01 (ref 1–1.03)
UROBILINOGEN UR STRIP.AUTO-MCNC: <2 MG/DL
WBC # BLD AUTO: 12.4 X10(3) UL (ref 4–13)
WBC # BLD AUTO: 13.9 X10(3) UL (ref 4–13)

## 2018-09-03 PROCEDURE — 99220 INITIAL OBSERVATION CARE,LEVL III: CPT | Performed by: HOSPITALIST

## 2018-09-03 RX ORDER — METOCLOPRAMIDE HYDROCHLORIDE 5 MG/ML
10 INJECTION INTRAMUSCULAR; INTRAVENOUS EVERY 8 HOURS PRN
Status: DISCONTINUED | OUTPATIENT
Start: 2018-09-03 | End: 2018-09-05

## 2018-09-03 RX ORDER — ONDANSETRON 2 MG/ML
4 INJECTION INTRAMUSCULAR; INTRAVENOUS EVERY 6 HOURS PRN
Status: DISCONTINUED | OUTPATIENT
Start: 2018-09-03 | End: 2018-09-05

## 2018-09-03 RX ORDER — HYDROMORPHONE HYDROCHLORIDE 1 MG/ML
1 INJECTION, SOLUTION INTRAMUSCULAR; INTRAVENOUS; SUBCUTANEOUS EVERY 2 HOUR PRN
Status: DISCONTINUED | OUTPATIENT
Start: 2018-09-03 | End: 2018-09-05

## 2018-09-03 RX ORDER — POLYETHYLENE GLYCOL 3350 17 G/17G
17 POWDER, FOR SOLUTION ORAL DAILY PRN
Status: DISCONTINUED | OUTPATIENT
Start: 2018-09-03 | End: 2018-09-05

## 2018-09-03 RX ORDER — MULTIVIT,IRON,MINERALS/LUTEIN
TABLET ORAL
COMMUNITY

## 2018-09-03 RX ORDER — KETOROLAC TROMETHAMINE 30 MG/ML
15 INJECTION, SOLUTION INTRAMUSCULAR; INTRAVENOUS EVERY 6 HOURS PRN
Status: DISCONTINUED | OUTPATIENT
Start: 2018-09-03 | End: 2018-09-05

## 2018-09-03 RX ORDER — HYDROMORPHONE HYDROCHLORIDE 1 MG/ML
0.5 INJECTION, SOLUTION INTRAMUSCULAR; INTRAVENOUS; SUBCUTANEOUS EVERY 2 HOUR PRN
Status: DISCONTINUED | OUTPATIENT
Start: 2018-09-03 | End: 2018-09-05

## 2018-09-03 RX ORDER — MORPHINE SULFATE 4 MG/ML
2 INJECTION, SOLUTION INTRAMUSCULAR; INTRAVENOUS EVERY 2 HOUR PRN
Status: DISCONTINUED | OUTPATIENT
Start: 2018-09-03 | End: 2018-09-03

## 2018-09-03 RX ORDER — SODIUM CHLORIDE 9 MG/ML
INJECTION, SOLUTION INTRAVENOUS CONTINUOUS
Status: DISCONTINUED | OUTPATIENT
Start: 2018-09-03 | End: 2018-09-05

## 2018-09-03 RX ORDER — HYDROCODONE BITARTRATE AND ACETAMINOPHEN 5; 325 MG/1; MG/1
1 TABLET ORAL EVERY 4 HOURS PRN
Status: DISCONTINUED | OUTPATIENT
Start: 2018-09-03 | End: 2018-09-05

## 2018-09-03 RX ORDER — SODIUM PHOSPHATE, DIBASIC AND SODIUM PHOSPHATE, MONOBASIC 7; 19 G/133ML; G/133ML
1 ENEMA RECTAL ONCE AS NEEDED
Status: DISCONTINUED | OUTPATIENT
Start: 2018-09-03 | End: 2018-09-05

## 2018-09-03 RX ORDER — ACETAMINOPHEN 325 MG/1
650 TABLET ORAL EVERY 4 HOURS PRN
Status: DISCONTINUED | OUTPATIENT
Start: 2018-09-03 | End: 2018-09-05

## 2018-09-03 RX ORDER — ALFUZOSIN HYDROCHLORIDE 10 MG/1
10 TABLET, EXTENDED RELEASE ORAL
Status: DISCONTINUED | OUTPATIENT
Start: 2018-09-03 | End: 2018-09-05

## 2018-09-03 RX ORDER — ALPRAZOLAM 1 MG/1
1 TABLET ORAL 3 TIMES DAILY
Status: DISCONTINUED | OUTPATIENT
Start: 2018-09-03 | End: 2018-09-03

## 2018-09-03 RX ORDER — NICOTINE 21 MG/24HR
1 PATCH, TRANSDERMAL 24 HOURS TRANSDERMAL DAILY
Status: DISCONTINUED | OUTPATIENT
Start: 2018-09-04 | End: 2018-09-05

## 2018-09-03 RX ORDER — MORPHINE SULFATE 4 MG/ML
4 INJECTION, SOLUTION INTRAMUSCULAR; INTRAVENOUS EVERY 2 HOUR PRN
Status: DISCONTINUED | OUTPATIENT
Start: 2018-09-03 | End: 2018-09-03

## 2018-09-03 RX ORDER — HYDROCODONE BITARTRATE AND ACETAMINOPHEN 5; 325 MG/1; MG/1
2 TABLET ORAL EVERY 4 HOURS PRN
Status: DISCONTINUED | OUTPATIENT
Start: 2018-09-03 | End: 2018-09-05

## 2018-09-03 RX ORDER — ONDANSETRON 2 MG/ML
4 INJECTION INTRAMUSCULAR; INTRAVENOUS ONCE
Status: COMPLETED | OUTPATIENT
Start: 2018-09-03 | End: 2018-09-03

## 2018-09-03 RX ORDER — KETOROLAC TROMETHAMINE 30 MG/ML
30 INJECTION, SOLUTION INTRAMUSCULAR; INTRAVENOUS EVERY 6 HOURS PRN
Status: DISCONTINUED | OUTPATIENT
Start: 2018-09-03 | End: 2018-09-05

## 2018-09-03 RX ORDER — CHOLECALCIFEROL (VITAMIN D3) 25 MCG
1 TABLET,CHEWABLE ORAL DAILY
Status: DISCONTINUED | OUTPATIENT
Start: 2018-09-03 | End: 2018-09-05

## 2018-09-03 RX ORDER — BISACODYL 10 MG
10 SUPPOSITORY, RECTAL RECTAL
Status: DISCONTINUED | OUTPATIENT
Start: 2018-09-03 | End: 2018-09-05

## 2018-09-03 RX ORDER — DOCUSATE SODIUM 100 MG/1
100 CAPSULE, LIQUID FILLED ORAL 2 TIMES DAILY
Status: DISCONTINUED | OUTPATIENT
Start: 2018-09-03 | End: 2018-09-05

## 2018-09-03 RX ORDER — MORPHINE SULFATE 4 MG/ML
1 INJECTION, SOLUTION INTRAMUSCULAR; INTRAVENOUS EVERY 2 HOUR PRN
Status: DISCONTINUED | OUTPATIENT
Start: 2018-09-03 | End: 2018-09-03

## 2018-09-03 RX ORDER — ACETAMINOPHEN 500 MG
500 TABLET ORAL ONCE
Status: COMPLETED | OUTPATIENT
Start: 2018-09-03 | End: 2018-09-03

## 2018-09-03 RX ORDER — MORPHINE SULFATE 4 MG/ML
4 INJECTION, SOLUTION INTRAMUSCULAR; INTRAVENOUS EVERY 30 MIN PRN
Status: DISCONTINUED | OUTPATIENT
Start: 2018-09-03 | End: 2018-09-03

## 2018-09-03 NOTE — ED PROVIDER NOTES
Patient Seen in: [facility] Emergency Department    History   Patient presents with:  Abdomen/Flank Pain (GI/)    Stated Complaint: States she is 17 weeks pregnant. denies vaginal bleeding or abdominal cramping. *    HPI    Patient is a 47-year-old reviewed and negative except as noted above.     Physical Exam   ED Triage Vitals [09/03/18 0203]  BP: 105/54  Pulse: 87  Resp: 20  Temp: 97.7 °F (36.5 °C)  Temp src: Temporal  SpO2: 99 %  O2 Device: None (Room air)    Current:/63   Pulse 80   Temp 97 Abnormality         Status                     ---------                               -----------         ------                     CBC W/ DIFFERENTIAL[[id number]]          Abnormal            Final result                 Pleas Unknown

## 2018-09-03 NOTE — ED NOTES
Pt crying hysterically, rolling around in ED cart upon RN rounding on patient. Pt medicated with tylenol PO (pt verified she can take tylenol and that it is the codeine she is allergic to). Pt also medicated with 3rd dose of morphine IV.  Informed patient o

## 2018-09-03 NOTE — CONSULTS
[facility]  Report of Consultation    Simona Chen Patient Status:  Observation    1990 MRN [id number]   [facility] 5NW-A Attending Delio Jang MD   Hosp Day # 0 PCP None Pcp     Reason for Consultation:  LEFT FLANK PAIN    Hi mg, Intravenous, Q8H PRN  •  ketorolac tromethamine (TORADOL) 30 MG/ML injection 15 mg, 15 mg, Intravenous, Q6H PRN **OR** ketorolac tromethamine (TORADOL) 30 MG/ML injection 30 mg, 30 mg, Intravenous, Q6H PRN  •  fentaNYL citrate (SUBLIMAZE) 0.05 MG/ML in 09/03/2018   ALT 18 09/03/2018    09/03/2018       Lab Results  Component Value Date   COLORUR Straw 09/03/2018   CLARITY Clear 09/03/2018   SPECGRAVITY 1.008 09/03/2018   GLUUR Negative 09/03/2018   BILUR Negative 09/03/2018   KETUR Negative 09/03/

## 2018-09-03 NOTE — CONSULTS
Consults   OB consult  32year old  at 16 weeks gestation admitted through ER with R flank pain c/w renal colic. H/o kidney stones. OB consult for pregnancy evaluation   Pt states pregnancy without complications.   Care received in [place], [place] Visit

## 2018-09-03 NOTE — PLAN OF CARE
Patient/Family Goals    • Patient/Family Long Term Goal Progressing    • Patient/Family Short Term Goal Progressing        Pt is alert and oriented. C/o pain that starts at left flank and radiates across lower abdomen. Pt describes pain as sharp.   [place]ette[place]

## 2018-09-03 NOTE — CONSULTS
Pharmacy Consult Note:  Medication List Evaluation for Pregnancy    Simona Chen is a 32year old female admitted for abdominal/flank pain.   Pharmacy has been asked by Dr. Saranya Bhatia to evaluate patient's current medications for safety during pregnancy - nik potentially occur in the . \"    6.  Ondansetron - \"Although ondansetron has been evaluated for the treatment of nausea and vomiting of pregnancy, current guidelines note data related to fetal safety are conflicting (ACOG 2135); ondansetron is genera

## 2018-09-03 NOTE — ED NOTES
Called into pt's room for continued pain, request for results from [facility] in [place] [address] [address] was faxed. Pt describes   \"'sharp rt lower abdominal pain\".

## 2018-09-03 NOTE — H&P
[facility]  History and Physical     Pallavi Aaron Patient Status:  Emergency    1990 MRN [id number]   Location [address] [facility] Attending Kaity Castillo MD   Hosp Day # 0 PCP None Pcp     Chief Complaint: Abdominal SWELLING  Naproxen                ITCHING  Tylenol With Codeine    SWELLING    Medications:    No current facility-administered medications on file prior to encounter.    Current Outpatient Prescriptions on File Prior to Encounter:  HYDROcodone-acetaminophe Labs:  Recent Labs   Lab  09/03/18 0217   WBC  13.9*   HGB  11.9*   MCV  95.9   PLT  64.0*       Recent Labs   Lab  09/03/18 0217   GLU  86   BUN  8   CREATSERUM  0.40*   GFRAA  163   GFRNAA  141   CA  8.7   ALB  2.9*   NA  140   K  3.7   CL  110

## 2018-09-03 NOTE — PLAN OF CARE
Patient/Family Goals    • Patient/Family Long Term Goal Progressing    • Patient/Family Short Term Goal Progressing            Problem: Abdominal pain, poss stone    Assessment: Pt a/ox4. Tolerating RA with spo2 wdl. Up ad nathaniel.  Severe \"stabbing\" pain on

## 2018-09-03 NOTE — ED NOTES
Pt directed to BR by  and voided without consulting with staff, not able to collect urine specimen from pt as requested by MD.

## 2018-09-04 ENCOUNTER — APPOINTMENT (OUTPATIENT)
Dept: ULTRASOUND IMAGING | Facility: HOSPITAL | Age: 28
End: 2018-09-04
Attending: UROLOGY

## 2018-09-04 PROCEDURE — 99225 SUBSEQUENT OBSERVATION CARE: CPT | Performed by: INTERNAL MEDICINE

## 2018-09-04 PROCEDURE — 76770 US EXAM ABDO BACK WALL COMP: CPT | Performed by: UROLOGY

## 2018-09-04 PROCEDURE — 99244 OFF/OP CNSLTJ NEW/EST MOD 40: CPT | Performed by: INTERNAL MEDICINE

## 2018-09-04 RX ORDER — FAMOTIDINE 20 MG/1
20 TABLET ORAL 2 TIMES DAILY
Status: DISCONTINUED | OUTPATIENT
Start: 2018-09-04 | End: 2018-09-05

## 2018-09-04 NOTE — PROGRESS NOTES
Urine being strained, white flecks noted in strainer. On call urologist paged, waiting for response. MD called back, updated on pt's status, no further orders at this time.

## 2018-09-04 NOTE — PROGRESS NOTES
Multidisciplinary Discharge Rounds held 9/4/2018. Treatment team members present today include , , Charge Nurse,  Nurse, RT, PT and Pharmacy caring for [place].      Other care providers present:    [facility]

## 2018-09-04 NOTE — CM/SW NOTE
09/04/18 1500   CM/SW Screening   Referral Source    Information Source Chart review;Nursing rounds   Patient's Mental Status Alert;Oriented   Patient's [address]   Patient lives with Spouse   Patient Status Prior to Admission

## 2018-09-04 NOTE — PLAN OF CARE
PAIN - ADULT    • Verbalizes/displays adequate comfort level or patient's stated pain goal Not Progressing          Patient/Family Goals    • Patient/Family Long Term Goal Progressing    • Patient/Family Short Term Goal Progressing          Pt A+Ox4.   WNL

## 2018-09-04 NOTE — CM/SW NOTE
Spoke to patient at bedside. Patient states she sees an OB [place] in [place] VNA list given to patient. / to remain available for support and/or discharge planning.

## 2018-09-04 NOTE — PROGRESS NOTES
[facility]  Urology Progress Note    Emeline Denver Patient Status:  Observation    1990 MRN [id number]   [facility] 5NW-A Attending Bharati Gastelum MD   Hosp Day # 0 PCP None Pcp     Subjective:  Emeline Denver is a(n) 32year old fe stone within the superior left kidney. 2. No evidence of hydronephrosis. US is negative for hydronephrosis; therefore, ureteral stent may not help. Discussed previously with patient.       BRIEN Galindo  [facility]

## 2018-09-04 NOTE — CONSULTS
[facility]  Report of Consultation    Orville Loving Patient Status:  Observation    1990 MRN [id number]   [facility] 5NW-A Attending Cliff Morton MD   Hosp Day # 0 PCP None Pcp     Reason for Consultation:    Evaluation for thro magnesium hydroxide (MILK OF MAGNESIA) 400 MG/5ML suspension 30 mL, 30 mL, Oral, Daily PRN  •  bisacodyl (DULCOLAX) rectal suppository 10 mg, 10 mg, Rectal, Daily PRN  •  FLEET ENEMA (FLEET) 7-19 GM/118ML enema 133 mL, 1 enema, Rectal, Once PRN  •  ondanse 97.2   MCH  32.4  32.4   MCHC  33.8  33.3   RDW  12.5  12.6   NEPRELIM  9.49*  8.43*   WBC  13.9*  12.4   PLT  64.0*  57.0*       Recent Labs   Lab  09/03/18   0217  09/03/18   0554   GLU  86  87   BUN  8  7*   CREATSERUM  0.40*  0.32*   GFRAA  163  175

## 2018-09-04 NOTE — PLAN OF CARE
PAIN - ADULT    • Verbalizes/displays adequate comfort level or patient's stated pain goal Progressing        Patient/Family Goals    • Patient/Family Long Term Goal Progressing    • Patient/Family Short Term Goal Progressing          Pt is oriented x4.

## 2018-09-04 NOTE — PAYOR COMM NOTE
--------------  ADMISSION REVIEW     Payor: N/A  Subscriber #:  No Subscriber Number on File  Authorization Number: N/A    Admit date: N/A  Admit time: N/A       Admitting Physician: Justin Lema DO  Attending Physician:  Mala Thakkar MD 13.9 (*)     RBC 3.67 (*)     HGB 11.9 (*)     PLT 64.0 (*)     Neutrophil Absolute Prelim 9.49 (*)     Neutrophil Absolute 9.49 (*)     All other components within normal limits   LIPASE - Normal   CBC WITH DIFFERENTIAL WITH PLATELET    Narrative:      The Fany Patterson RN    9/3/2018 1543 Given 50 mcg Intravenous Lenard Melgar RN      HYDROcodone-acetaminophen [facility]) 5-325 MG per tab 2 tablet     Date Action Dose Route User    9/4/2018 8580 Given 2 tablet Oral Prudence JAMES Mckeon    9/3/2018 2300 Given 2 table NaCl infusion     Date Action Dose Route User    9/4/2018 7791 New Bag (none) Intravenous Lissette Haney RN    9/4/2018 0148 New Bag (none) Intravenous Lissette Haney RN    9/3/2018 1300 New Bag (none) Intravenous Oswaldo Escalera RN        P 17 week gest

## 2018-09-04 NOTE — PROGRESS NOTES
[facility]  Progress Note    Vivian Josetwin Patient Status:  Observation    1990 MRN [id number]   [facility] 5NW-A Attending Marija Jacobs MD   Hosp Day # 0 PCP None Pcp     CC: Abdominal pain    SUBJECTIVE:  Left lower quadrant a mg 650 mg Oral Q4H PRN   Or      HYDROcodone-acetaminophen (NORCO) 5-325 MG per tab 1 tablet 1 tablet Oral Q4H PRN   Or      HYDROcodone-acetaminophen (NORCO) 5-325 MG per tab 2 tablet 2 tablet Oral Q4H PRN   docusate sodium (COLACE) cap 100 mg 100 mg Oral understanding, but still states she has 10 out of 10 pain now and needs pain medications. 4. Urology on consult  2. Incidental pregnant state 17 weeks - seen by gynecologist  3. Thrombocytopenia– hematology consult. Platelet count 57 today  4.  Mild ane

## 2018-09-05 VITALS
BODY MASS INDEX: 30 KG/M2 | WEIGHT: 158.88 LBS | OXYGEN SATURATION: 96 % | HEART RATE: 72 BPM | SYSTOLIC BLOOD PRESSURE: 97 MMHG | RESPIRATION RATE: 20 BRPM | DIASTOLIC BLOOD PRESSURE: 30 MMHG | HEIGHT: 61 IN | TEMPERATURE: 98 F

## 2018-09-05 PROCEDURE — 99225 SUBSEQUENT OBSERVATION CARE: CPT | Performed by: HOSPITALIST

## 2018-09-05 RX ORDER — ACETAMINOPHEN 325 MG/1
650 TABLET ORAL EVERY 6 HOURS PRN
Status: DISCONTINUED | OUTPATIENT
Start: 2018-09-05 | End: 2018-09-05

## 2018-09-05 NOTE — PROGRESS NOTES
[facility]  Progress Note    Nahomy Mason Patient Status:  Observation    1990 MRN [id number]   [facility] 5NW-A Attending Ulises Castillo MD   Hosp Day # 0 PCP None Pcp     CC: Abdominal pain    SUBJECTIVE:  Appears sleepy, stati 650 mg Oral Q4H PRN   Or      HYDROcodone-acetaminophen (NORCO) 5-325 MG per tab 1 tablet 1 tablet Oral Q4H PRN   Or      HYDROcodone-acetaminophen (NORCO) 5-325 MG per tab 2 tablet 2 tablet Oral Q4H PRN   docusate sodium (COLACE) cap 100 mg 100 mg Oral BI

## 2018-09-05 NOTE — PROGRESS NOTES
Patient leaving AMA. Paperwork signed and on chart. Dr. Juan Jose Escalante notified face to face. PIV removed. Patient states to have transportation, will not be driving self.

## 2018-09-05 NOTE — PROGRESS NOTES
Just informed by RN that patient does not want to stay any longer and will be leaving AMA    I discussed with patient that she will not be receiving any prescriptions if she leaves AMA,  She states she is okay with that     Patient refusing intervention fo

## 2018-09-05 NOTE — PLAN OF CARE
Assumed care at 2130  A/ox4, vss on room air. Complains of ble pain and left flank pain. Medicated with dilaudid, norco and one dose of Toradol iv. Straining urine, no stones seen at this time.   PAIN - ADULT    • Verbalizes/displays adequate comfort level

## 2018-09-05 NOTE — PROGRESS NOTES
[facility]  Urology Progress Note    Oliver Genao Patient Status:  Observation    1990 MRN [id number]   [facility] 5NW-A Attending Lora Antoine MD   Hosp Day # 0 PCP None Pcp     Subjective:  Oliver Genao is a(n) 32year old f

## 2018-09-06 NOTE — DISCHARGE SUMMARY
[facility]  DISCHARGE SUMMARY     Sun Rodas Patient Status:  Observation    1990 MRN [id number]   [facility] 5NW-A Attending No att. providers found   Hosp Day # 0 PCP None Pcp     Date of Admission: 9/3/2018  Date of Disch Synopsis: Patient is a 66-year-old female admitted with left lower quadrant abdominal pain. This pain is radiating to her groin and her back. It was secondary to renal colic.   She is found to have a 6 mm stone but without any hydronephrosis or renal dysf specified. Vital signs:       Physical Exam:    General: No acute distress. Respiratory: Clear to auscultation bilaterally. No wheezes. No rhonchi. Cardiovascular: S1, S2. Regular rate and rhythm. No murmurs, rubs or gallops.    Abdomen: Soft, nontend

## 2018-09-07 ENCOUNTER — APPOINTMENT (OUTPATIENT)
Dept: ULTRASOUND IMAGING | Facility: HOSPITAL | Age: 28
DRG: 781 | End: 2018-09-07
Attending: STUDENT IN AN ORGANIZED HEALTH CARE EDUCATION/TRAINING PROGRAM

## 2018-09-07 ENCOUNTER — HOSPITAL ENCOUNTER (INPATIENT)
Facility: HOSPITAL | Age: 28
LOS: 1 days | Discharge: LEFT BEFORE DISCHARGE | DRG: 781 | End: 2018-09-07
Attending: STUDENT IN AN ORGANIZED HEALTH CARE EDUCATION/TRAINING PROGRAM | Admitting: HOSPITALIST

## 2018-09-07 VITALS
DIASTOLIC BLOOD PRESSURE: 58 MMHG | RESPIRATION RATE: 16 BRPM | TEMPERATURE: 98 F | HEART RATE: 66 BPM | OXYGEN SATURATION: 98 % | BODY MASS INDEX: 28 KG/M2 | WEIGHT: 150 LBS | SYSTOLIC BLOOD PRESSURE: 99 MMHG

## 2018-09-07 DIAGNOSIS — D72.829 LEUKOCYTOSIS, UNSPECIFIED TYPE: ICD-10-CM

## 2018-09-07 DIAGNOSIS — Z3A.18 18 WEEKS GESTATION OF PREGNANCY: ICD-10-CM

## 2018-09-07 DIAGNOSIS — N20.0 NEPHROLITHIASIS: ICD-10-CM

## 2018-09-07 DIAGNOSIS — R10.9 FLANK PAIN: Primary | ICD-10-CM

## 2018-09-07 DIAGNOSIS — N39.0 URINARY TRACT INFECTION WITH HEMATURIA, SITE UNSPECIFIED: ICD-10-CM

## 2018-09-07 DIAGNOSIS — R31.9 URINARY TRACT INFECTION WITH HEMATURIA, SITE UNSPECIFIED: ICD-10-CM

## 2018-09-07 LAB
ALBUMIN SERPL-MCNC: 3.1 G/DL (ref 3.5–4.8)
ALBUMIN/GLOB SERPL: 0.9 {RATIO} (ref 1–2)
ALP LIVER SERPL-CCNC: 69 U/L (ref 37–98)
ALT SERPL-CCNC: 28 U/L (ref 14–54)
ANION GAP SERPL CALC-SCNC: 13 MMOL/L (ref 0–18)
AST SERPL-CCNC: 20 U/L (ref 15–41)
BASOPHILS # BLD AUTO: 0.04 X10(3) UL (ref 0–0.1)
BASOPHILS NFR BLD AUTO: 0.3 %
BILIRUB SERPL-MCNC: 0.2 MG/DL (ref 0.1–2)
BILIRUB UR QL STRIP.AUTO: NEGATIVE
BUN BLD-MCNC: 13 MG/DL (ref 8–20)
BUN/CREAT SERPL: 27.1 (ref 10–20)
CALCIUM BLD-MCNC: 8.9 MG/DL (ref 8.3–10.3)
CHLORIDE SERPL-SCNC: 106 MMOL/L (ref 101–111)
CO2 SERPL-SCNC: 22 MMOL/L (ref 22–32)
COLOR UR AUTO: YELLOW
CREAT BLD-MCNC: 0.48 MG/DL (ref 0.55–1.02)
EOSINOPHIL # BLD AUTO: 0.18 X10(3) UL (ref 0–0.3)
EOSINOPHIL NFR BLD AUTO: 1.2 %
ERYTHROCYTE [DISTWIDTH] IN BLOOD BY AUTOMATED COUNT: 12.4 % (ref 11.5–16)
GLOBULIN PLAS-MCNC: 3.4 G/DL (ref 2.5–4)
GLUCOSE BLD-MCNC: 85 MG/DL (ref 70–99)
GLUCOSE UR STRIP.AUTO-MCNC: NEGATIVE MG/DL
HCT VFR BLD AUTO: 35.1 % (ref 34–50)
HGB BLD-MCNC: 12.3 G/DL (ref 12–16)
IMMATURE GRANULOCYTE COUNT: 0.17 X10(3) UL (ref 0–1)
IMMATURE GRANULOCYTE RATIO %: 1.1 %
KETONES UR STRIP.AUTO-MCNC: NEGATIVE MG/DL
LYMPHOCYTES # BLD AUTO: 2.37 X10(3) UL (ref 0.9–4)
LYMPHOCYTES NFR BLD AUTO: 16 %
M PROTEIN MFR SERPL ELPH: 6.5 G/DL (ref 6.1–8.3)
MCH RBC QN AUTO: 32.8 PG (ref 27–33.2)
MCHC RBC AUTO-ENTMCNC: 35 G/DL (ref 31–37)
MCV RBC AUTO: 93.6 FL (ref 81–100)
MONOCYTES # BLD AUTO: 0.86 X10(3) UL (ref 0.1–1)
MONOCYTES NFR BLD AUTO: 5.8 %
NEUTROPHIL ABS PRELIM: 11.2 X10 (3) UL (ref 1.3–6.7)
NEUTROPHILS # BLD AUTO: 11.2 X10(3) UL (ref 1.3–6.7)
NEUTROPHILS NFR BLD AUTO: 75.6 %
NITRITE UR QL STRIP.AUTO: NEGATIVE
OSMOLALITY SERPL CALC.SUM OF ELEC: 291 MOSM/KG (ref 275–295)
PH UR STRIP.AUTO: 6 [PH] (ref 4.5–8)
PLATELET # BLD AUTO: 71 10(3)UL (ref 150–450)
POTASSIUM SERPL-SCNC: 3.7 MMOL/L (ref 3.6–5.1)
PROT UR STRIP.AUTO-MCNC: NEGATIVE MG/DL
RBC # BLD AUTO: 3.75 X10(6)UL (ref 3.8–5.1)
RBC #/AREA URNS AUTO: >10 /HPF
RED CELL DISTRIBUTION WIDTH-SD: 42.5 FL (ref 35.1–46.3)
SODIUM SERPL-SCNC: 141 MMOL/L (ref 136–144)
SP GR UR STRIP.AUTO: 1.02 (ref 1–1.03)
UROBILINOGEN UR STRIP.AUTO-MCNC: 2 MG/DL
WBC # BLD AUTO: 14.8 X10(3) UL (ref 4–13)

## 2018-09-07 PROCEDURE — 99223 1ST HOSP IP/OBS HIGH 75: CPT | Performed by: HOSPITALIST

## 2018-09-07 PROCEDURE — 76815 OB US LIMITED FETUS(S): CPT | Performed by: STUDENT IN AN ORGANIZED HEALTH CARE EDUCATION/TRAINING PROGRAM

## 2018-09-07 PROCEDURE — 76770 US EXAM ABDO BACK WALL COMP: CPT | Performed by: STUDENT IN AN ORGANIZED HEALTH CARE EDUCATION/TRAINING PROGRAM

## 2018-09-07 RX ORDER — MORPHINE SULFATE 4 MG/ML
0.5 INJECTION, SOLUTION INTRAMUSCULAR; INTRAVENOUS EVERY 4 HOURS PRN
Status: DISCONTINUED | OUTPATIENT
Start: 2018-09-07 | End: 2018-09-07

## 2018-09-07 RX ORDER — KETOROLAC TROMETHAMINE 30 MG/ML
15 INJECTION, SOLUTION INTRAMUSCULAR; INTRAVENOUS EVERY 6 HOURS PRN
Status: DISCONTINUED | OUTPATIENT
Start: 2018-09-07 | End: 2018-09-07

## 2018-09-07 RX ORDER — KETOROLAC TROMETHAMINE 30 MG/ML
30 INJECTION, SOLUTION INTRAMUSCULAR; INTRAVENOUS EVERY 6 HOURS PRN
Status: DISCONTINUED | OUTPATIENT
Start: 2018-09-07 | End: 2018-09-07

## 2018-09-07 RX ORDER — ONDANSETRON 2 MG/ML
4 INJECTION INTRAMUSCULAR; INTRAVENOUS EVERY 6 HOURS PRN
Status: DISCONTINUED | OUTPATIENT
Start: 2018-09-07 | End: 2018-09-07

## 2018-09-07 RX ORDER — SENNOSIDES 8.6 MG
8.6 TABLET ORAL 2 TIMES DAILY
Status: DISCONTINUED | OUTPATIENT
Start: 2018-09-07 | End: 2018-09-07

## 2018-09-07 RX ORDER — HYDROCODONE BITARTRATE AND ACETAMINOPHEN 5; 325 MG/1; MG/1
1-2 TABLET ORAL EVERY 4 HOURS PRN
Status: DISCONTINUED | OUTPATIENT
Start: 2018-09-07 | End: 2018-09-07

## 2018-09-07 RX ORDER — CHOLECALCIFEROL (VITAMIN D3) 25 MCG
1 TABLET,CHEWABLE ORAL DAILY
Status: DISCONTINUED | OUTPATIENT
Start: 2018-09-07 | End: 2018-09-07

## 2018-09-07 RX ORDER — NITROFURANTOIN 25; 75 MG/1; MG/1
100 CAPSULE ORAL 2 TIMES DAILY
Status: DISCONTINUED | OUTPATIENT
Start: 2018-09-07 | End: 2018-09-07

## 2018-09-07 RX ORDER — ONDANSETRON 2 MG/ML
4 INJECTION INTRAMUSCULAR; INTRAVENOUS ONCE
Status: COMPLETED | OUTPATIENT
Start: 2018-09-07 | End: 2018-09-07

## 2018-09-07 RX ORDER — SODIUM CHLORIDE 9 MG/ML
INJECTION, SOLUTION INTRAVENOUS CONTINUOUS
Status: DISCONTINUED | OUTPATIENT
Start: 2018-09-07 | End: 2018-09-07

## 2018-09-07 RX ORDER — MORPHINE SULFATE 4 MG/ML
2 INJECTION, SOLUTION INTRAMUSCULAR; INTRAVENOUS ONCE
Status: COMPLETED | OUTPATIENT
Start: 2018-09-07 | End: 2018-09-07

## 2018-09-07 RX ORDER — LEVOFLOXACIN 5 MG/ML
750 INJECTION, SOLUTION INTRAVENOUS ONCE
Status: COMPLETED | OUTPATIENT
Start: 2018-09-07 | End: 2018-09-07

## 2018-09-07 RX ORDER — LEVOFLOXACIN 5 MG/ML
750 INJECTION, SOLUTION INTRAVENOUS EVERY 24 HOURS
Status: DISCONTINUED | OUTPATIENT
Start: 2018-09-08 | End: 2018-09-07

## 2018-09-07 RX ORDER — METOCLOPRAMIDE HYDROCHLORIDE 5 MG/ML
10 INJECTION INTRAMUSCULAR; INTRAVENOUS EVERY 8 HOURS PRN
Status: DISCONTINUED | OUTPATIENT
Start: 2018-09-07 | End: 2018-09-07

## 2018-09-07 RX ORDER — ACETAMINOPHEN 325 MG/1
650 TABLET ORAL EVERY 6 HOURS PRN
Status: DISCONTINUED | OUTPATIENT
Start: 2018-09-07 | End: 2018-09-07

## 2018-09-07 RX ORDER — ACETAMINOPHEN 500 MG
1000 TABLET ORAL ONCE
Status: COMPLETED | OUTPATIENT
Start: 2018-09-07 | End: 2018-09-07

## 2018-09-07 NOTE — ED PROVIDER NOTES
Patient Seen in: [facility] Emergency Department    History   Patient presents with:  Abdomen/Flank Pain (GI/)    Stated Complaint: KNOWN KIDNEY STONE    HPI    Patient is a 58-year-old female approximately 18 weeks pregnant who presents emergency bending over the cart and rocking back and forth. HENT:   Head: Normocephalic and atraumatic. Eyes: Pupils are equal, round, and reactive to light. Neck: Normal range of motion. Neck supple.    Cardiovascular: Normal rate, regular rhythm, normal heart ------                     CBC W/ DIFFERENTIAL[[id number]]          Abnormal            Final result                 Please view results for these tests on the individual orders.    URINE CULTURE, ROUTINE       ED Course as of Sep 07 0501  ------------------ in second trimester O26.892, R10.9 9/3/2018 Yes    Flank pain R10.9 5/0/6352 Yes    Renal colic V64 1/5/2375 Yes    [facility] D69.6 12/9/2017 Yes

## 2018-09-07 NOTE — PROGRESS NOTES
Pharmacy Note: Renal dose adjustment of Vivian Leija is a 32year old female who has been prescribed Levaquin. CrCl is 130.5 ml/min so the dose has been adjusted  to 750mg IV q24h per hospital renal dose adjustment protocol.   Pharmacy will fol

## 2018-09-07 NOTE — PLAN OF CARE
GASTROINTESTINAL - ADULT    • Minimal or absence of nausea and vomiting Progressing        PAIN - ADULT    • Verbalizes/displays adequate comfort level or patient's stated pain goal Progressing          Admitted d/t L flank pain that radiates to left abdom

## 2018-09-07 NOTE — PLAN OF CARE
GASTROINTESTINAL - ADULT    • Minimal or absence of nausea and vomiting Progressing        PAIN - ADULT    • Verbalizes/displays adequate comfort level or patient's stated pain goal Progressing            Patient is a 32year old female.  18 weeks pregnant

## 2018-09-07 NOTE — PAYOR COMM NOTE
--------------  ADMISSION REVIEW       9/7    Authorization Number: N/A    Admit date: 9/7/18  Admit time: 7380       Admitting Physician: Austin Story MD  Attending Physician:  Belgica Mason MD  Primary Care Physician: Pcp, None        History   Josy LMP 04/28/2018   SpO2 95%   BMI 28.34 kg/m²          Physical Exam    Constitutional: oriented to person, place, and time, appears in severe painful distress pacing, at times bending over the cart and rocking back and forth.   HENT:   Head: Normocephalic an DIFFERENTIAL WITH PLATELET.   Procedure                               Abnormality         Status                     ---------                               -----------         ------                     CBC W/ DIFFERENTIAL[[id number]]          Abnormal specified.       Medications Prescribed:  Current Discharge Medication List        Present on Admission  Date Reviewed: 9/7/2018          ICD-10-CM Noted POA    Abdominal pain during pregnancy in second trimester O26.892, R10.9 9/3/2018 Yes    Flank pain R1 antibiotics for possible UTI. She is asking for something stronger for pain control at this time.     Past Medical History:  Past Medical History:   Diagnosis Date   • Anxiety    • Depression    • Fibromyalgia    • Thrombocytopenia, acquired ([facility]         P gallops. Equal pulses. Chest and Back: No tenderness or deformity. Abdomen: Soft, nontender, nondistended. Positive bowel sounds. No rebound, guarding or organomegaly. Neurologic: No focal neurological deficits. CNII-XII grossly intact.   Musculoskelet Date Action Dose Route User    9/7/2018 0158 Given 1000 mg Oral Mendy Gordon RN      fentaNYL citrate (SUBLIMAZE) 0.05 MG/ML injection 100 mcg     Date Action Dose Route User    9/7/2018 0340 Given 100 mcg Intravenous Jennifer Alfaro RN      HYDR

## 2018-09-07 NOTE — PROGRESS NOTES
[facility]  Progress Note     Neeru Janechi Patient Status:  Inpatient    1990 MRN [id number]   [facility] 2NE-A Attending Alondra Merino MD   Hosp Day # 0 PCP None Pcp     Chief Complaint: Abdominal pain     S: Patient  With L ASSESSMENT / PLAN:     1. LLQ pain  2. Pregnancy - 18 weeks GA  3. ITP  4. FMG  5. Depression/ Anxiety    Plan of care:   Abdominal Us to delineate any diverticular dx  Stool Softeners  Mo Iv 0.5 q 4 PRN- and tylenol PRN- avoid Toradol due to plt.  Rishabh Banerjee

## 2018-09-07 NOTE — CONSULTS
[facility]    NUTRITION INITIAL ASSESSMENT    Pt does not meet malnutrition criteria.     NUTRITION DIAGNOSIS/PROBLEM:    Inadequate energy intake related to severe flank pain 2/2 renal colic, decreased appetite/nausea as evidenced by pt eating less th kg)  Protein: 68-81 grams protein/day (1-1.2 grams protein per kg)  Fluid: ~1 ml/kcal or per MD discretion    MONITOR AND EVALUATE/NUTRITION GOALS:  1. Pt to increase meal intake to 75%  2. No signs of skin breakdown  3.  Maintain lean body mass    MEDICATI

## 2018-09-07 NOTE — H&P
[facility]  History and Physical     Marta Padron Patient Status:  Emergency    1990 MRN [id number]   Location [address] [facility] Attending Zunilda Strong MD   Hosp Day # 0 PCP None Pcp     Chief Complaint: Flank pain facility-administered medications on file prior to encounter. Current Outpatient Prescriptions on File Prior to Encounter:  Prenatal Vit-Fe Fumarate-FA (MULTI PRENATAL) 27-0.8 MG Oral Tab Take by mouth.  Disp:  Rfl:    HYDROcodone-acetaminophen 5-325 MG O CREATSERUM  0.40*  0.32*  0.48*   GFRAA  163  175  153   GFRNAA  141  152  133   CA  8.7  8.3  8.9   ALB  2.9*   --   3.1*   NA  140  139  141   K  3.7  3.8  3.7   CL  110  111  106   CO2  24.0  20.0*  22.0   ALKPHO  60   --   69   AST  15   --   20   AL

## 2018-09-07 NOTE — PLAN OF CARE
Patient called RN into the room. Patient would like to sign out AMA due to a family emergency. Dr. Jordan Crawford was paged and informed. AMA form was signed by the patient. Patient verbalized understanding of consequences that may occur if patient left.  Patient s

## 2018-09-07 NOTE — ED INITIAL ASSESSMENT (HPI)
Patient arrives from home with c/o flank pain.  States diagnosed with kidney stone but signed out AMA

## 2018-09-07 NOTE — CONSULTS
[facility] Urology   Consultation Note    Sun Rodas Patient Status:  Inpatient    1990 MRN [id number]   [facility] 2NE-A Attending Faisal Brito MD   Hosp Day # 0 PCP None Pcp     Reason for Consultation:  Left flank morgan DHA) cap 1 capsule, 1 capsule, Oral, Daily  •  HYDROcodone-acetaminophen (NORCO) 5-325 MG per tab 1-2 tablet, 1-2 tablet, Oral, Q4H PRN  •  Nitrofurantoin Monohyd Macro (MACROBID) cap 100 mg, 100 mg, Oral, BID  •  morphINE sulfate (PF) 4 MG/ML injection 0. stone within the superior left kidney. BLADDER:  Visualized portions are unremarkable. A left-sided ureteral jet is seen. OTHER:  Fetal heart rate is 139 beats per min.     =====  CONCLUSION:    1.  There is a 6 mm stone within the superior left kidne to participate in the care of your patient.     Bea Chowdary P.A.-C  [facility]  9/7/2018  10:11 AM

## 2025-04-15 NOTE — CM/SW NOTE
MARCO received a call from patient stating she was seen in the emergency room last night. She stated she took an uber to the pharmacy to  her prescriptions. She picked up an antibiotic and the 4 tabs of Lorazepam that were prescribed for emergency use until her physician in [place] could re-order. She stated that she misplaced the Lorazepam. She thinks she may have left them in the Uber and is in need of a refill. Informed her that the ER will not refill medications and she needs to contact her primary care physician for a refill.

## 2025-06-02 NOTE — ED QUICK NOTES
Pt refused discharge education from both ED Physician and RN and started shouting in room. Emotionally labile. Threw BP cuff off of arm. Pt stated \"You all don't care about me, you just want to make money! What goes around comes around, and you all don't care about me!\"  Patient then ambulated independently out of ER with steady gait and no outward signs of pain.

## 2025-06-02 NOTE — DISCHARGE INSTRUCTIONS
Follow-up with the back specialist within next week for further care.   return for any concerning symptoms.
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
## (undated) NOTE — ED AVS SNAPSHOT
THE [facility] Emergency Department in [address]  Phone:  [phone]  Fax:  [phone]          Orville Loving   MRN: [id number]    Department:  THE [facility] Emergency Department in [place]   Date of Visit:  7/14/2017 IF THERE IS ANY CHANGE OR WORSENING OF YOUR CONDITION, CALL YOUR PRIMARY CARE PHYSICIAN AT ONCE OR RETURN IMMEDIATELY TO THE EMERGENCY DEPARTMENT.     If you have been prescribed any medication(s), please fill your prescription right away and begin taking t

## (undated) NOTE — ED AVS SNAPSHOT
Mickey Daley   MRN: [id number]    Department:  [facility] Emergency Department   Date of Visit:  12/13/2017           Disclosure     Insurance plans vary and the physician(s) referred by the ER may not be covered by your plan.  Please contact your tell this physician (or your personal doctor if your instructions are to return to your personal doctor) about any new or lasting problems. The primary care or specialist physician will see patients referred from the [facility] Emergency Department.  Severo Pastures

## (undated) NOTE — ED AVS SNAPSHOT
Tori Joseph   MRN: [id number]    Department:  [facility] Emergency Department   Date of Visit:  12/7/2017           Disclosure     Insurance plans vary and the physician(s) referred by the ER may not be covered by your plan.  Please contact your i tell this physician (or your personal doctor if your instructions are to return to your personal doctor) about any new or lasting problems. The primary care or specialist physician will see patients referred from the [facility] Emergency Department.  Geovani Fontenot

## (undated) NOTE — ED AVS SNAPSHOT
[facility] Emergency Department  [facility]  [address] [address]  Phone:  [phone]  Fax:  [phone]          Marta Padron   MRN: [id number]    Department:  [facility] Emergency Department   Date of Visit:  7/16/2017 CARE PHYSICIAN AT ONCE OR RETURN IMMEDIATELY TO THE EMERGENCY DEPARTMENT.     If you have been prescribed any medication(s), please fill your prescription right away and begin taking the medication(s) as directed    If the emergency physician has read X-ray

## (undated) NOTE — ED AVS SNAPSHOT
Peg Randall   MRN: [id number]    Department:  [facility] Emergency Department   Date of Visit:  12/8/2017           Disclosure     Insurance plans vary and the physician(s) referred by the ER may not be covered by your plan.  Please contact your i tell this physician (or your personal doctor if your instructions are to return to your personal doctor) about any new or lasting problems. The primary care or specialist physician will see patients referred from the [facility] Emergency Department.  Miky Sanchez

## (undated) NOTE — ED AVS SNAPSHOT
Aniket Saavedra   MRN: [id number]    Department:  [facility] Emergency Department in [place]   Date of Visit:  9/12/2017           Disclosure     Insurance plans vary and the physician(s) referred by the ER may not be covered by your plan.  Please contact y If you have been prescribed any medication(s), please fill your prescription right away and begin taking the medication(s) as directed    If the emergency physician has read X-rays, these will be re-interpreted by a radiologist.  If there is a significant